# Patient Record
Sex: FEMALE | Race: WHITE | NOT HISPANIC OR LATINO | Employment: OTHER | URBAN - METROPOLITAN AREA
[De-identification: names, ages, dates, MRNs, and addresses within clinical notes are randomized per-mention and may not be internally consistent; named-entity substitution may affect disease eponyms.]

---

## 2017-12-05 ENCOUNTER — ESTABLISHED COMPREHENSIVE EXAM (OUTPATIENT)
Dept: URBAN - METROPOLITAN AREA CLINIC 36 | Facility: CLINIC | Age: 77
End: 2017-12-05

## 2017-12-05 DIAGNOSIS — H01.006: ICD-10-CM

## 2017-12-05 DIAGNOSIS — H04.123: ICD-10-CM

## 2017-12-05 DIAGNOSIS — H01.003: ICD-10-CM

## 2017-12-05 PROCEDURE — 92014 COMPRE OPH EXAM EST PT 1/>: CPT

## 2017-12-05 PROCEDURE — 68761Q PUNCTAL PLUG/QUINTESS DISSOLVABLE PLUG/EACH

## 2017-12-05 PROCEDURE — 1036F TOBACCO NON-USER: CPT

## 2017-12-05 PROCEDURE — G8427 DOCREV CUR MEDS BY ELIG CLIN: HCPCS

## 2017-12-05 ASSESSMENT — VISUAL ACUITY
OS_CC: 20/25
OS_SC: 20/200
OU_CC: 20/20
OD_SC: 20/25
OD_CC: 20/20
OD_CC: J1
OS_CC: J1

## 2017-12-05 ASSESSMENT — TONOMETRY
OS_IOP_MMHG: 16
OD_IOP_MMHG: 16

## 2018-05-01 ENCOUNTER — FOLLOW UP (OUTPATIENT)
Dept: URBAN - METROPOLITAN AREA CLINIC 36 | Facility: CLINIC | Age: 78
End: 2018-05-01

## 2018-05-01 DIAGNOSIS — H04.123: ICD-10-CM

## 2018-05-01 DIAGNOSIS — H01.003: ICD-10-CM

## 2018-05-01 PROCEDURE — G8427 DOCREV CUR MEDS BY ELIG CLIN: HCPCS

## 2018-05-01 PROCEDURE — 4040F PNEUMOC VAC/ADMIN/RCVD: CPT

## 2018-05-01 PROCEDURE — 1036F TOBACCO NON-USER: CPT

## 2018-05-01 PROCEDURE — 99211 OFF/OP EST MAY X REQ PHY/QHP: CPT

## 2018-05-01 PROCEDURE — G8482 FLU IMMUNIZE ORDER/ADMIN: HCPCS

## 2018-05-01 ASSESSMENT — VISUAL ACUITY
OD_CC: J1
OD_CC: 20/20-2
OS_CC: J1
OS_CC: 20/25-1

## 2018-05-01 ASSESSMENT — TONOMETRY
OD_IOP_MMHG: 16
OS_IOP_MMHG: 16

## 2018-06-06 ENCOUNTER — FOLLOW UP (OUTPATIENT)
Dept: URBAN - METROPOLITAN AREA CLINIC 36 | Facility: CLINIC | Age: 78
End: 2018-06-06

## 2018-06-06 DIAGNOSIS — H04.123: ICD-10-CM

## 2018-06-06 PROCEDURE — 99212 OFFICE O/P EST SF 10 MIN: CPT

## 2018-06-06 PROCEDURE — G8482 FLU IMMUNIZE ORDER/ADMIN: HCPCS

## 2018-06-06 PROCEDURE — 4040F PNEUMOC VAC/ADMIN/RCVD: CPT

## 2018-06-06 PROCEDURE — G8427 DOCREV CUR MEDS BY ELIG CLIN: HCPCS

## 2018-06-06 PROCEDURE — 68761Q PUNCTAL PLUG/QUINTESS DISSOLVABLE PLUG/EACH

## 2018-06-06 PROCEDURE — 1036F TOBACCO NON-USER: CPT

## 2018-06-06 ASSESSMENT — TONOMETRY
OS_IOP_MMHG: 16
OD_IOP_MMHG: 16

## 2018-06-06 ASSESSMENT — VISUAL ACUITY
OD_CC: J1
OS_CC: 20/20-2
OS_CC: J1
OD_CC: 20/20-2

## 2018-08-23 NOTE — PATIENT DISCUSSION
CORNEAL ABRASION: OS, RESOLVED. USE EMYCIN KIMBERLY QHS FOR ONE WEEK THEN DISCONTINUE. RETURN FOR FOLLOW-UP AS SCHEDULED.

## 2018-08-23 NOTE — PATIENT DISCUSSION
Patient came in for CL DISP apt today. Patient is currently being treated for K abr. We did not trial the lenses in office today. We gave the lenses for patient to try at home and return in a week for a CL CHECK.

## 2018-12-05 ENCOUNTER — ESTABLISHED COMPREHENSIVE EXAM (OUTPATIENT)
Dept: URBAN - METROPOLITAN AREA CLINIC 36 | Facility: CLINIC | Age: 78
End: 2018-12-05

## 2018-12-05 DIAGNOSIS — H04.123: ICD-10-CM

## 2018-12-05 PROCEDURE — G9903 PT SCRN TBCO ID AS NON USER: HCPCS

## 2018-12-05 PROCEDURE — 92014 COMPRE OPH EXAM EST PT 1/>: CPT

## 2018-12-05 PROCEDURE — 1036F TOBACCO NON-USER: CPT

## 2018-12-05 PROCEDURE — A4262 TEMPORARY TEAR DUCT PLUG: HCPCS

## 2018-12-05 PROCEDURE — G8427 DOCREV CUR MEDS BY ELIG CLIN: HCPCS

## 2018-12-05 PROCEDURE — 68761Q PUNCTAL PLUG/QUINTESS DISSOLVABLE PLUG/EACH

## 2018-12-05 ASSESSMENT — VISUAL ACUITY
OD_CC: 20/20
OS_PH: 20/25+2
OS_SC: J1
OD_SC: J3
OD_SC: 20/20-2
OS_SC: 20/40-1
OS_CC: 20/40

## 2018-12-05 ASSESSMENT — TONOMETRY
OS_IOP_MMHG: 16
OD_IOP_MMHG: 16

## 2019-06-06 ENCOUNTER — FOLLOW UP (OUTPATIENT)
Dept: URBAN - METROPOLITAN AREA CLINIC 36 | Facility: CLINIC | Age: 79
End: 2019-06-06

## 2019-06-06 DIAGNOSIS — H04.123: ICD-10-CM

## 2019-06-06 PROCEDURE — 68761Q PUNCTAL PLUG/QUINTESS DISSOLVABLE PLUG/EACH

## 2019-06-06 PROCEDURE — 99211 OFF/OP EST MAY X REQ PHY/QHP: CPT

## 2019-06-06 PROCEDURE — A4262 TEMPORARY TEAR DUCT PLUG: HCPCS

## 2019-06-06 ASSESSMENT — VISUAL ACUITY
OS_CC: 20/40
OD_CC: 20/20
OS_PH: 20/25+2
OD_CC: J1
OS_CC: J1

## 2019-12-09 ENCOUNTER — ESTABLISHED COMPREHENSIVE EXAM (OUTPATIENT)
Dept: URBAN - METROPOLITAN AREA CLINIC 36 | Facility: CLINIC | Age: 79
End: 2019-12-09

## 2019-12-09 DIAGNOSIS — H04.123: ICD-10-CM

## 2019-12-09 DIAGNOSIS — H02.833: ICD-10-CM

## 2019-12-09 PROCEDURE — A4262 TEMPORARY TEAR DUCT PLUG: HCPCS

## 2019-12-09 PROCEDURE — 68761Q PUNCTAL PLUG/QUINTESS DISSOLVABLE PLUG/EACH

## 2019-12-09 PROCEDURE — 92014 COMPRE OPH EXAM EST PT 1/>: CPT

## 2019-12-09 ASSESSMENT — TONOMETRY
OS_IOP_MMHG: 16
OD_IOP_MMHG: 16

## 2019-12-09 ASSESSMENT — VISUAL ACUITY
OS_SC: 20/30+2
OS_CC: 20/80+1
OD_SC: J5
OS_SC: J1+
OD_CC: J1+
OD_SC: 20/20-1
OS_CC: J1+
OD_CC: 20/25

## 2020-06-10 ENCOUNTER — FOLLOW UP (OUTPATIENT)
Dept: URBAN - METROPOLITAN AREA CLINIC 36 | Facility: CLINIC | Age: 80
End: 2020-06-10

## 2020-06-10 DIAGNOSIS — H04.123: ICD-10-CM

## 2020-06-10 PROCEDURE — A4262 TEMPORARY TEAR DUCT PLUG: HCPCS

## 2020-06-10 PROCEDURE — 68761Q PUNCTAL PLUG/QUINTESS DISSOLVABLE PLUG/EACH

## 2020-06-10 PROCEDURE — 99212 OFFICE O/P EST SF 10 MIN: CPT

## 2020-06-10 ASSESSMENT — VISUAL ACUITY
OS_CC: 20/25-2
OD_CC: 20/20-2
OS_CC: J2
OD_CC: J1

## 2020-06-10 ASSESSMENT — TONOMETRY
OS_IOP_MMHG: 16
OD_IOP_MMHG: 16

## 2021-01-13 ENCOUNTER — ESTABLISHED PATIENT (OUTPATIENT)
Dept: URBAN - METROPOLITAN AREA CLINIC 36 | Facility: CLINIC | Age: 81
End: 2021-01-13

## 2021-01-13 DIAGNOSIS — H04.123: ICD-10-CM

## 2021-01-13 PROCEDURE — 92012 INTRM OPH EXAM EST PATIENT: CPT

## 2021-01-13 PROCEDURE — 68761Q PUNCTAL PLUG/QUINTESS DISSOLVABLE PLUG/EACH

## 2021-01-13 PROCEDURE — A4262 TEMPORARY TEAR DUCT PLUG: HCPCS

## 2021-02-08 ENCOUNTER — ESTABLISHED COMPREHENSIVE EXAM (OUTPATIENT)
Dept: URBAN - METROPOLITAN AREA CLINIC 36 | Facility: CLINIC | Age: 81
End: 2021-02-08

## 2021-02-08 DIAGNOSIS — H04.123: ICD-10-CM

## 2021-02-08 DIAGNOSIS — H01.021: ICD-10-CM

## 2021-02-08 DIAGNOSIS — H02.831: ICD-10-CM

## 2021-02-08 DIAGNOSIS — Z96.1: ICD-10-CM

## 2021-02-08 PROCEDURE — 92014 COMPRE OPH EXAM EST PT 1/>: CPT

## 2021-02-08 ASSESSMENT — TONOMETRY
OD_IOP_MMHG: 16
OD_IOP_MMHG: 17
OS_IOP_MMHG: 17
OS_IOP_MMHG: 17

## 2021-02-08 ASSESSMENT — VISUAL ACUITY
OD_CC: 20/25+2
OD_SC: J5
OD_CC: J1
OS_SC: J1
OS_CC: 20/20-2
OS_SC: 20/50-1
OS_CC: J1
OD_SC: 20/25

## 2021-05-13 ENCOUNTER — FOLLOW UP (OUTPATIENT)
Dept: URBAN - METROPOLITAN AREA CLINIC 36 | Facility: CLINIC | Age: 81
End: 2021-05-13

## 2021-05-13 DIAGNOSIS — H04.123: ICD-10-CM

## 2021-05-13 PROCEDURE — 68761Q PUNCTAL PLUG/QUINTESS DISSOLVABLE PLUG/EACH

## 2021-05-13 PROCEDURE — A4262 TEMPORARY TEAR DUCT PLUG: HCPCS

## 2021-05-13 PROCEDURE — 92012 INTRM OPH EXAM EST PATIENT: CPT

## 2021-05-13 ASSESSMENT — VISUAL ACUITY
OS_CC: 20/30-2
OD_CC: 20/25+2

## 2021-11-29 ENCOUNTER — ESTABLISHED COMPREHENSIVE EXAM (OUTPATIENT)
Dept: URBAN - METROPOLITAN AREA CLINIC 36 | Facility: CLINIC | Age: 81
End: 2021-11-29

## 2021-11-29 DIAGNOSIS — Z96.1: ICD-10-CM

## 2021-11-29 DIAGNOSIS — H04.123: ICD-10-CM

## 2021-11-29 PROCEDURE — A4262 TEMPORARY TEAR DUCT PLUG: HCPCS

## 2021-11-29 PROCEDURE — 92014 COMPRE OPH EXAM EST PT 1/>: CPT

## 2021-11-29 PROCEDURE — 68761Q PUNCTAL PLUG/QUINTESS DISSOLVABLE PLUG/EACH

## 2021-11-29 ASSESSMENT — VISUAL ACUITY
OS_CC: 20/30+1
OD_CC: 20/20
OS_SC: J6
OS_SC: 20/60+2
OD_SC: J6
OS_CC: J1
OD_CC: J1
OD_SC: 20/25

## 2021-11-29 ASSESSMENT — TONOMETRY
OD_IOP_MMHG: 20
OS_IOP_MMHG: 20

## 2022-05-11 ENCOUNTER — COMPREHENSIVE EXAM (OUTPATIENT)
Dept: URBAN - METROPOLITAN AREA CLINIC 36 | Facility: CLINIC | Age: 82
End: 2022-05-11

## 2022-05-11 DIAGNOSIS — H02.831: ICD-10-CM

## 2022-05-11 DIAGNOSIS — H01.024: ICD-10-CM

## 2022-05-11 DIAGNOSIS — Z98.890: ICD-10-CM

## 2022-05-11 DIAGNOSIS — H04.123: ICD-10-CM

## 2022-05-11 DIAGNOSIS — Z96.1: ICD-10-CM

## 2022-05-11 DIAGNOSIS — H01.021: ICD-10-CM

## 2022-05-11 DIAGNOSIS — H02.834: ICD-10-CM

## 2022-05-11 PROCEDURE — 68761Q PUNCTAL PLUG/QUINTESS DISSOLVABLE PLUG/EACH

## 2022-05-11 PROCEDURE — A4262 TEMPORARY TEAR DUCT PLUG: HCPCS

## 2022-05-11 PROCEDURE — 92014 COMPRE OPH EXAM EST PT 1/>: CPT

## 2022-05-11 ASSESSMENT — VISUAL ACUITY
OD_SC: J6
OD_CC: 20/25+2
OS_CC: 20/30
OD_CC: J2
OS_CC: J1
OD_SC: 20/25
OS_SC: 20/100-1
OS_SC: J1

## 2022-11-16 ENCOUNTER — ESTABLISHED PATIENT (OUTPATIENT)
Dept: URBAN - METROPOLITAN AREA CLINIC 36 | Facility: CLINIC | Age: 82
End: 2022-11-16

## 2022-11-16 DIAGNOSIS — H04.123: ICD-10-CM

## 2022-11-16 PROCEDURE — A4262 TEMPORARY TEAR DUCT PLUG: HCPCS

## 2022-11-16 PROCEDURE — 68761Q PUNCTAL PLUG/QUINTESS DISSOLVABLE PLUG/EACH

## 2022-11-16 PROCEDURE — 92012 INTRM OPH EXAM EST PATIENT: CPT

## 2022-11-16 ASSESSMENT — VISUAL ACUITY
OD_SC: 20/25-1
OD_CC: J1
OS_CC: 20/25
OD_CC: 20/25+2
OD_SC: J6
OS_SC: 20/80
OS_PH: 20/40
OS_SC: J1
OS_CC: J1

## 2022-11-16 ASSESSMENT — TONOMETRY
OS_IOP_MMHG: 18
OD_IOP_MMHG: 18

## 2023-05-17 ENCOUNTER — COMPREHENSIVE EXAM (OUTPATIENT)
Dept: URBAN - METROPOLITAN AREA CLINIC 36 | Facility: CLINIC | Age: 83
End: 2023-05-17

## 2023-05-17 DIAGNOSIS — H01.024: ICD-10-CM

## 2023-05-17 DIAGNOSIS — H02.834: ICD-10-CM

## 2023-05-17 DIAGNOSIS — H04.123: ICD-10-CM

## 2023-05-17 DIAGNOSIS — Z98.890: ICD-10-CM

## 2023-05-17 DIAGNOSIS — H02.831: ICD-10-CM

## 2023-05-17 DIAGNOSIS — Z96.1: ICD-10-CM

## 2023-05-17 DIAGNOSIS — H01.021: ICD-10-CM

## 2023-05-17 PROCEDURE — 92014 COMPRE OPH EXAM EST PT 1/>: CPT

## 2023-05-17 ASSESSMENT — VISUAL ACUITY
OD_SC: 20/40+2
OS_CC: 20/30+1
OS_CC: J1
OS_SC: 20/70
OD_CC: 20/30-1
OD_CC: J1

## 2023-05-17 ASSESSMENT — TONOMETRY
OS_IOP_MMHG: 17
OD_IOP_MMHG: 17

## 2024-04-17 ENCOUNTER — COMPREHENSIVE EXAM (OUTPATIENT)
Dept: URBAN - METROPOLITAN AREA CLINIC 36 | Facility: CLINIC | Age: 84
End: 2024-04-17

## 2024-04-17 DIAGNOSIS — H02.831: ICD-10-CM

## 2024-04-17 DIAGNOSIS — Z96.1: ICD-10-CM

## 2024-04-17 DIAGNOSIS — H01.024: ICD-10-CM

## 2024-04-17 DIAGNOSIS — Z98.890: ICD-10-CM

## 2024-04-17 DIAGNOSIS — H52.7: ICD-10-CM

## 2024-04-17 DIAGNOSIS — H02.834: ICD-10-CM

## 2024-04-17 DIAGNOSIS — H04.123: ICD-10-CM

## 2024-04-17 DIAGNOSIS — H01.021: ICD-10-CM

## 2024-04-17 PROCEDURE — 92015 DETERMINE REFRACTIVE STATE: CPT

## 2024-04-17 PROCEDURE — 92014 COMPRE OPH EXAM EST PT 1/>: CPT

## 2024-04-17 ASSESSMENT — VISUAL ACUITY
OD_CC: 20/25-2
OS_SC: J1
OD_SC: 20/40+2
OD_CC: J1
OS_SC: 20/60+2
OD_SC: J5
OS_CC: J1
OS_CC: 20/25-1

## 2024-04-17 ASSESSMENT — TONOMETRY
OS_IOP_MMHG: 21
OD_IOP_MMHG: 17

## 2024-04-18 ENCOUNTER — FOLLOW UP (OUTPATIENT)
Dept: URBAN - METROPOLITAN AREA CLINIC 36 | Facility: CLINIC | Age: 84
End: 2024-04-18

## 2024-04-18 DIAGNOSIS — H01.024: ICD-10-CM

## 2024-04-18 DIAGNOSIS — H01.021: ICD-10-CM

## 2024-04-18 DIAGNOSIS — H04.123: ICD-10-CM

## 2024-04-18 PROCEDURE — 6876050 CLOSURE OF LAC PUNCTUM BILATERAL

## 2024-04-18 PROCEDURE — 68761S PUNCTUM PLUG / SILICONE,EACH

## 2024-04-18 PROCEDURE — 92012 INTRM OPH EXAM EST PATIENT: CPT | Mod: 25

## 2024-04-18 ASSESSMENT — VISUAL ACUITY
OS_CC: 20/30-2
OD_CC: 20/25-1

## 2024-05-16 ENCOUNTER — ESTABLISHED PATIENT (OUTPATIENT)
Dept: URBAN - METROPOLITAN AREA CLINIC 36 | Facility: CLINIC | Age: 84
End: 2024-05-16

## 2024-05-16 DIAGNOSIS — D23.112: ICD-10-CM

## 2024-05-16 PROCEDURE — 67840 REMOVE EYELID LESION: CPT | Mod: E4

## 2024-05-16 PROCEDURE — 92285 EXTERNAL OCULAR PHOTOGRAPHY: CPT

## 2024-05-16 PROCEDURE — 99214 OFFICE O/P EST MOD 30 MIN: CPT

## 2024-05-16 ASSESSMENT — VISUAL ACUITY
OS_CC: 20/30-2
OD_CC: 20/25-1

## 2024-08-27 NOTE — PATIENT DISCUSSION
[employer]ix AquaOD+0.25+[phone]/30&nbsp;SN &nbsp; J2&nbsp;JOANNE cw Consent: The patient's consent was obtained including but not limited to risks of crusting, scabbing, blistering, scarring, darker or lighter pigmentary change, recurrence, incomplete removal and infection. Show Aperture Variable?: Yes Render Note In Bullet Format When Appropriate: No Detail Level: Detailed Duration Of Freeze Thaw-Cycle (Seconds): 0 Post-Care Instructions: I reviewed with the patient in detail post-care instructions. Patient is to wear sunprotection, and avoid picking at any of the treated lesions. Pt may apply Vaseline to crusted or scabbing areas.